# Patient Record
Sex: MALE | Race: ASIAN | NOT HISPANIC OR LATINO | URBAN - METROPOLITAN AREA
[De-identification: names, ages, dates, MRNs, and addresses within clinical notes are randomized per-mention and may not be internally consistent; named-entity substitution may affect disease eponyms.]

---

## 2023-07-26 ENCOUNTER — APPOINTMENT (OUTPATIENT)
Age: 38
Setting detail: DERMATOLOGY
End: 2023-07-31

## 2023-07-26 VITALS — TEMPERATURE: 97.1 F

## 2023-07-26 DIAGNOSIS — L63.8 OTHER ALOPECIA AREATA: ICD-10-CM

## 2023-07-26 DIAGNOSIS — L20.89 OTHER ATOPIC DERMATITIS: ICD-10-CM

## 2023-07-26 PROCEDURE — OTHER COUNSELING: OTHER

## 2023-07-26 PROCEDURE — OTHER INTRALESIONAL KENALOG: OTHER

## 2023-07-26 PROCEDURE — OTHER PRESCRIPTION: OTHER

## 2023-07-26 PROCEDURE — OTHER MIPS QUALITY: OTHER

## 2023-07-26 PROCEDURE — OTHER PRESCRIPTION MEDICATION MANAGEMENT: OTHER

## 2023-07-26 PROCEDURE — 11900 INJECT SKIN LESIONS </W 7: CPT

## 2023-07-26 PROCEDURE — 99204 OFFICE O/P NEW MOD 45 MIN: CPT | Mod: 25

## 2023-07-26 RX ORDER — CLOBETASOL PROPIONATE 0.5 MG/G
AEROSOL, FOAM TOPICAL
Qty: 100 | Refills: 1 | Status: ERX | COMMUNITY
Start: 2023-07-26

## 2023-07-26 ASSESSMENT — LOCATION ZONE DERM
LOCATION ZONE: NECK
LOCATION ZONE: SCALP

## 2023-07-26 ASSESSMENT — LOCATION DETAILED DESCRIPTION DERM
LOCATION DETAILED: RIGHT CENTRAL LATERAL NECK
LOCATION DETAILED: RIGHT CENTRAL PARIETAL SCALP

## 2023-07-26 ASSESSMENT — LOCATION SIMPLE DESCRIPTION DERM
LOCATION SIMPLE: SCALP
LOCATION SIMPLE: NECK

## 2023-07-26 NOTE — PROCEDURE: PRESCRIPTION MEDICATION MANAGEMENT
Initiate Treatment: Tovet Emollient 0.05 % topical foam \\nQuantity: 100.0 g  Days Supply: 30\\nSig: Apply to AA on the scalp and neck BID x 2 weeks, then stop for 1 week.  Restart PRN with new flares.
Detail Level: Zone
Render In Strict Bullet Format?: No

## 2023-07-26 NOTE — PROCEDURE: INTRALESIONAL KENALOG
Validate Note Data When Using Inventory: Yes
Medical Necessity Clause: This procedure was medically necessary because the lesions that were treated were:
How Many Mls Were Removed From The 40 Mg/Ml (10ml) Vial When Preparing The Injectable Solution?: 0
Show Inventory Tab: Hide
Include Z78.9 (Other Specified Conditions Influencing Health Status) As An Associated Diagnosis?: No
Kenalog Type Of Vial: Multiple Dose
Ndc# For Kenalog Only: 4970-0715-54
Consent: The risks of atrophy were reviewed with the patient.
Lot # For Kenalog (Optional): [id number]
Expiration Date For Kenalog (Optional): 08/2024
Detail Level: Detailed
Total Volume (Ccs): 0.4
Administered By (Optional): Lori Garcia PA-C
Concentration Of Kenalog Solution Injected (Mg/Ml): 2.5
Kenalog Preparation: Kenalog

## 2023-08-29 ENCOUNTER — APPOINTMENT (OUTPATIENT)
Age: 38
Setting detail: DERMATOLOGY
End: 2023-08-30

## 2023-08-29 DIAGNOSIS — L63.8 OTHER ALOPECIA AREATA: ICD-10-CM

## 2023-08-29 DIAGNOSIS — L29.8 OTHER PRURITUS: ICD-10-CM

## 2023-08-29 PROCEDURE — 11900 INJECT SKIN LESIONS </W 7: CPT

## 2023-08-29 PROCEDURE — OTHER MIPS QUALITY: OTHER

## 2023-08-29 PROCEDURE — OTHER INTRALESIONAL KENALOG: OTHER

## 2023-08-29 PROCEDURE — 99212 OFFICE O/P EST SF 10 MIN: CPT | Mod: 25

## 2023-08-29 PROCEDURE — OTHER COUNSELING: OTHER

## 2023-08-29 ASSESSMENT — LOCATION DETAILED DESCRIPTION DERM
LOCATION DETAILED: LEFT MEDIAL FRONTAL SCALP
LOCATION DETAILED: RIGHT SUPERIOR OCCIPITAL SCALP
LOCATION DETAILED: RIGHT SUPERIOR PARIETAL SCALP
LOCATION DETAILED: LEFT SUPERIOR PARIETAL SCALP

## 2023-08-29 ASSESSMENT — LOCATION ZONE DERM: LOCATION ZONE: SCALP

## 2023-08-29 ASSESSMENT — LOCATION SIMPLE DESCRIPTION DERM
LOCATION SIMPLE: SCALP
LOCATION SIMPLE: POSTERIOR SCALP
LOCATION SIMPLE: LEFT SCALP

## 2023-08-29 NOTE — PROCEDURE: INTRALESIONAL KENALOG
Kenalog Type Of Vial: Multiple Dose
Show Inventory Tab: Hide
Kenalog Preparation: Kenalog
Consent: The risks of atrophy were reviewed with the patient.
Treatment Number (Optional): 2
How Many Mls Were Removed From The 40 Mg/Ml (1ml) Vial When Preparing The Injectable Solution?: 0
Validate Note Data When Using Inventory: Yes
Total Volume (Ccs): 0.5
Require Ndc Code?: No
Concentration Of Kenalog Solution Injected (Mg/Ml): 5.0
Detail Level: Detailed
Medical Necessity Clause: This procedure was medically necessary because the lesions that were treated were:
Administered By (Optional): Lori Garcia PA-C